# Patient Record
Sex: FEMALE | Race: WHITE | HISPANIC OR LATINO
[De-identification: names, ages, dates, MRNs, and addresses within clinical notes are randomized per-mention and may not be internally consistent; named-entity substitution may affect disease eponyms.]

---

## 2024-10-29 ENCOUNTER — DASHBOARD ENCOUNTERS (OUTPATIENT)
Age: 64
End: 2024-10-29

## 2024-10-29 ENCOUNTER — OFFICE VISIT (OUTPATIENT)
Dept: URBAN - METROPOLITAN AREA CLINIC 60 | Facility: CLINIC | Age: 64
End: 2024-10-29
Payer: COMMERCIAL

## 2024-10-29 VITALS
TEMPERATURE: 97.9 F | DIASTOLIC BLOOD PRESSURE: 80 MMHG | SYSTOLIC BLOOD PRESSURE: 124 MMHG | HEIGHT: 61 IN | WEIGHT: 176.2 LBS | BODY MASS INDEX: 33.27 KG/M2 | HEART RATE: 87 BPM | OXYGEN SATURATION: 95 %

## 2024-10-29 DIAGNOSIS — R14.0 ABDOMINAL BLOATING: ICD-10-CM

## 2024-10-29 DIAGNOSIS — K21.9 GERD WITHOUT ESOPHAGITIS: ICD-10-CM

## 2024-10-29 DIAGNOSIS — Z12.11 COLON CANCER SCREENING: ICD-10-CM

## 2024-10-29 DIAGNOSIS — Z86.0102 HISTORY OF HYPERPLASTIC POLYP OF COLON: ICD-10-CM

## 2024-10-29 PROBLEM — 266435005: Status: ACTIVE | Noted: 2024-10-29

## 2024-10-29 PROCEDURE — 99204 OFFICE O/P NEW MOD 45 MIN: CPT

## 2024-10-29 RX ORDER — ESCITALOPRAM 10 MG/1
TAKE 1 TABLET BY MOUTH DAILY TABLET, FILM COATED ORAL
Qty: 30 EACH | Refills: 0 | Status: ACTIVE | COMMUNITY

## 2024-10-29 RX ORDER — CARVEDILOL 12.5 MG/1
TAKE 1 TABLET BY MOUTH ONCE DAILY TABLET, FILM COATED ORAL
Qty: 90 EACH | Refills: 2 | Status: ACTIVE | COMMUNITY

## 2024-10-29 RX ORDER — PEMBROLIZUMAB 25 MG/ML
AS DIRECTED INJECTION, SOLUTION INTRAVENOUS
Status: ACTIVE | COMMUNITY
Start: 2024-10-29

## 2024-10-29 RX ORDER — LORATADINE 5 MG/1
AS DIRECTED TABLET, CHEWABLE ORAL
Status: ACTIVE | COMMUNITY
Start: 2024-10-29

## 2024-10-29 RX ORDER — ACETAMINOPHEN 325 MG/1
1 TABLET AS NEEDED TABLET, FILM COATED ORAL
Status: ACTIVE | COMMUNITY
Start: 2024-10-29

## 2024-10-29 RX ORDER — FAMOTIDINE 40 MG/1
1 TABLET TABLET, FILM COATED ORAL TWICE A DAY
Qty: 180 TABLET | Refills: 1 | OUTPATIENT
Start: 2024-10-29

## 2024-10-29 NOTE — PHYSICAL EXAM HENT:
Head, normocephalic, atraumatic, Face, Face within normal limits, Ears, External ears within normal limits
Abdomen soft, non-tender and non-distended, no rebound, no guarding and no masses. no hepatosplenomegaly.

## 2024-10-29 NOTE — HPI-TODAY'S VISIT:
Patient is a 63-year-old female coming in for colon cancer screening as well as complaint of abdominal bloating and pain.  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 Currently on Keytruda. Dx in 2021 and completed chemo in 2022.   Patient is Malian-speaking and accompanied by her daughter who assists in translation.  Patient indicates that she is currently undergoing close follow-up with [facility] due to diagnosis with malignant neoplasm of maxillary sinus in 2021.  She undergoes CT scans every 3 to 6 months as well as regular blood work and a PET scan every year.  She is currently on Keytruda for the past year and 1/2 to 2 years and understands that that can give her GI side effects.  She has noticed over the past 3 months increasing abdominal bloating as well as intermittent soft stools with formed stools in between.  No signs of GI bleeding.  Abdominal cramping at times.  Her last PET scan in August did indicate increased FDG activity possibly related to gastritis.  Patient is not currently on any acid suppression medication but does indicate that she has been having reflux over the past few years thought to be due to the chemo medication.  Moving her bowels 2-3 times per day.  Her last colonoscopy in 2017 with a history of colon polyps but she does not recall where it was done.  Her daughter does recall she was told to come back in 5 years.  She had a previous EGD at the same time in 2017. 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 No records of this available.  Labs 9/24/2024 CBC with hemoglobin 12.4, hematocrit 38.2, .8 and platelet 212 CMP with total bilirubin 0.4, AST 21, ALT 17, alk phos 170 and GFR 44  PET scan 8/3/2024 noted postsurgical changes related to endoscopic sinus surgery with partial resection of left maxillary sinus and inferior left orbit with low level FDG activity in surgical cavity more focal and intense in the region of the left pterygoid palatine fossa which could indicate residual or recurrent disease, enlarged FDG avid left axillary lymph node that is increased in size compared to previous CT chest, stable subcentimeter pulmonary nodule in right middle lobe, increased FDG activity along proximal second metatarsal of left foot, diffuse uptake noted in the stomach possibly related to gastritis  CT abdomen pelvis 4/2/2024 with no CT evidence for metastatic disease in the abdomen or pelvis

## 2024-11-05 ENCOUNTER — LAB OUTSIDE AN ENCOUNTER (OUTPATIENT)
Dept: URBAN - METROPOLITAN AREA CLINIC 60 | Facility: CLINIC | Age: 64
End: 2024-11-05

## 2024-11-11 ENCOUNTER — WEB ENCOUNTER (OUTPATIENT)
Dept: URBAN - METROPOLITAN AREA CLINIC 60 | Facility: CLINIC | Age: 64
End: 2024-11-11

## 2024-11-11 RX ORDER — FAMOTIDINE 40 MG/1
1 TABLET TABLET, FILM COATED ORAL TWICE A DAY
Qty: 180 | Refills: 0
Start: 2024-10-29

## 2025-02-11 ENCOUNTER — OFFICE VISIT (OUTPATIENT)
Dept: URBAN - METROPOLITAN AREA SURGERY CENTER 4 | Facility: SURGERY CENTER | Age: 65
End: 2025-02-11
Payer: COMMERCIAL

## 2025-02-11 ENCOUNTER — CLAIMS CREATED FROM THE CLAIM WINDOW (OUTPATIENT)
Dept: URBAN - METROPOLITAN AREA CLINIC 4 | Facility: CLINIC | Age: 65
End: 2025-02-11
Payer: COMMERCIAL

## 2025-02-11 ENCOUNTER — CLAIMS CREATED FROM THE CLAIM WINDOW (OUTPATIENT)
Dept: URBAN - METROPOLITAN AREA SURGERY CENTER 4 | Facility: SURGERY CENTER | Age: 65
End: 2025-02-11

## 2025-02-11 DIAGNOSIS — K29.40 CHRONIC ATROPHIC GASTRITIS WITHOUT BLEEDING: ICD-10-CM

## 2025-02-11 DIAGNOSIS — C7A.092 MALIGNANT CARCINOID TUMOR OF THE STOMACH: ICD-10-CM

## 2025-02-11 DIAGNOSIS — K44.9 HIATAL HERNIA: ICD-10-CM

## 2025-02-11 DIAGNOSIS — Z53.8 PROCEDURE AND TREATMENT NOT CARRIED OUT FOR OTHER REASONS: ICD-10-CM

## 2025-02-11 DIAGNOSIS — Z90.3 HISTORY OF SLEEVE GASTRECTOMY: ICD-10-CM

## 2025-02-11 DIAGNOSIS — K63.5 BENIGN COLON POLYP: ICD-10-CM

## 2025-02-11 DIAGNOSIS — K63.5 COLON POLYP: ICD-10-CM

## 2025-02-11 DIAGNOSIS — K31.7 GASTRIC POLYPS: ICD-10-CM

## 2025-02-11 DIAGNOSIS — D12.7 BENIGN NEOPLASM OF RECTOSIGMOID JUNCTION: ICD-10-CM

## 2025-02-11 DIAGNOSIS — Z86.0100 PERSONAL HISTORY OF COLON POLYPS, UNSPECIFIED: ICD-10-CM

## 2025-02-11 DIAGNOSIS — K31.89 OTHER DISEASES OF STOMACH AND DUODENUM: ICD-10-CM

## 2025-02-11 DIAGNOSIS — K64.1 GRADE II HEMORRHOIDS: ICD-10-CM

## 2025-02-11 DIAGNOSIS — K29.60 OTHER GASTRITIS WITHOUT BLEEDING: ICD-10-CM

## 2025-02-11 DIAGNOSIS — K31.7 GASTRIC POLYP: ICD-10-CM

## 2025-02-11 DIAGNOSIS — Z86.0100 PERSONAL HISTORY OF COLONIC POLYPS: ICD-10-CM

## 2025-02-11 DIAGNOSIS — K29.70 ERYTHEMATOUS GASTROPATHY: ICD-10-CM

## 2025-02-11 DIAGNOSIS — D12.5 BENIGN NEOPLASM OF SIGMOID COLON: ICD-10-CM

## 2025-02-11 DIAGNOSIS — K29.70 GASTRITIS, UNSPECIFIED, WITHOUT BLEEDING: ICD-10-CM

## 2025-02-11 PROCEDURE — 88342 IMHCHEM/IMCYTCHM 1ST ANTB: CPT | Performed by: PATHOLOGY

## 2025-02-11 PROCEDURE — 88305 TISSUE EXAM BY PATHOLOGIST: CPT | Performed by: PATHOLOGY

## 2025-02-11 PROCEDURE — 43239 EGD BIOPSY SINGLE/MULTIPLE: CPT | Performed by: INTERNAL MEDICINE

## 2025-02-11 PROCEDURE — 45385 COLONOSCOPY W/LESION REMOVAL: CPT | Performed by: INTERNAL MEDICINE

## 2025-02-11 PROCEDURE — 88312 SPECIAL STAINS GROUP 1: CPT | Performed by: PATHOLOGY

## 2025-02-11 PROCEDURE — 88341 IMHCHEM/IMCYTCHM EA ADD ANTB: CPT | Performed by: PATHOLOGY

## 2025-02-11 PROCEDURE — 00813 ANES UPR LWR GI NDSC PX: CPT | Performed by: NURSE ANESTHETIST, CERTIFIED REGISTERED

## 2025-02-11 PROCEDURE — G9999 DOC SYS RSN <3 COLON: HCPCS | Performed by: INTERNAL MEDICINE

## 2025-02-11 RX ORDER — FAMOTIDINE 40 MG/1
1 TABLET TABLET, FILM COATED ORAL TWICE A DAY
Qty: 180 | Refills: 0 | Status: ACTIVE | COMMUNITY
Start: 2024-10-29

## 2025-02-11 RX ORDER — ACETAMINOPHEN 325 MG/1
1 TABLET AS NEEDED TABLET, FILM COATED ORAL
Status: ACTIVE | COMMUNITY
Start: 2024-10-29

## 2025-02-11 RX ORDER — PEMBROLIZUMAB 25 MG/ML
AS DIRECTED INJECTION, SOLUTION INTRAVENOUS
Status: ACTIVE | COMMUNITY
Start: 2024-10-29

## 2025-02-11 RX ORDER — ESCITALOPRAM 10 MG/1
TAKE 1 TABLET BY MOUTH DAILY TABLET, FILM COATED ORAL
Qty: 30 EACH | Refills: 0 | Status: ACTIVE | COMMUNITY

## 2025-02-11 RX ORDER — LORATADINE 5 MG/1
AS DIRECTED TABLET, CHEWABLE ORAL
Status: ACTIVE | COMMUNITY
Start: 2024-10-29

## 2025-02-11 RX ORDER — CARVEDILOL 12.5 MG/1
TAKE 1 TABLET BY MOUTH ONCE DAILY TABLET, FILM COATED ORAL
Qty: 90 EACH | Refills: 2 | Status: ACTIVE | COMMUNITY

## 2025-02-25 ENCOUNTER — LAB OUTSIDE AN ENCOUNTER (OUTPATIENT)
Dept: URBAN - METROPOLITAN AREA CLINIC 60 | Facility: CLINIC | Age: 65
End: 2025-02-25

## 2025-02-25 ENCOUNTER — OFFICE VISIT (OUTPATIENT)
Dept: URBAN - METROPOLITAN AREA CLINIC 60 | Facility: CLINIC | Age: 65
End: 2025-02-25
Payer: COMMERCIAL

## 2025-02-25 VITALS
HEART RATE: 116 BPM | HEIGHT: 61 IN | OXYGEN SATURATION: 95 % | BODY MASS INDEX: 32.06 KG/M2 | WEIGHT: 169.8 LBS | TEMPERATURE: 98.5 F | DIASTOLIC BLOOD PRESSURE: 74 MMHG | SYSTOLIC BLOOD PRESSURE: 116 MMHG | RESPIRATION RATE: 12 BRPM

## 2025-02-25 DIAGNOSIS — D3A.092: ICD-10-CM

## 2025-02-25 DIAGNOSIS — R14.0 ABDOMINAL BLOATING: ICD-10-CM

## 2025-02-25 DIAGNOSIS — K29.40 ATROPHIC GASTRITIS WITHOUT HEMORRHAGE: ICD-10-CM

## 2025-02-25 DIAGNOSIS — K21.9 GERD WITHOUT ESOPHAGITIS: ICD-10-CM

## 2025-02-25 PROBLEM — 84568007: Status: ACTIVE | Noted: 2025-02-25

## 2025-02-25 PROCEDURE — 99214 OFFICE O/P EST MOD 30 MIN: CPT

## 2025-02-25 RX ORDER — CARVEDILOL 12.5 MG/1
TAKE 1 TABLET BY MOUTH ONCE DAILY TABLET, FILM COATED ORAL
Qty: 90 EACH | Refills: 2 | Status: ACTIVE | COMMUNITY

## 2025-02-25 RX ORDER — PANTOPRAZOLE SODIUM 40 MG/1
1 TABLET 1/2 TO 1 HOUR BEFORE MORNING MEAL TABLET, DELAYED RELEASE ORAL ONCE A DAY
Qty: 30 | Refills: 3 | OUTPATIENT
Start: 2025-02-25

## 2025-02-25 RX ORDER — FAMOTIDINE 40 MG/1
1 TABLET TABLET, FILM COATED ORAL TWICE A DAY
Qty: 180 | Refills: 0 | Status: ACTIVE | COMMUNITY
Start: 2024-10-29

## 2025-02-25 RX ORDER — ACETAMINOPHEN 325 MG/1
1 TABLET AS NEEDED TABLET, FILM COATED ORAL
Status: ACTIVE | COMMUNITY
Start: 2024-10-29

## 2025-02-25 RX ORDER — LORATADINE 5 MG/1
AS DIRECTED TABLET, CHEWABLE ORAL
Status: ACTIVE | COMMUNITY
Start: 2024-10-29

## 2025-02-25 RX ORDER — PEMBROLIZUMAB 25 MG/ML
AS DIRECTED INJECTION, SOLUTION INTRAVENOUS
Status: ACTIVE | COMMUNITY
Start: 2024-10-29

## 2025-02-25 RX ORDER — ESCITALOPRAM 10 MG/1
TAKE 1 TABLET BY MOUTH DAILY TABLET, FILM COATED ORAL
Qty: 30 EACH | Refills: 0 | Status: ACTIVE | COMMUNITY

## 2025-02-25 NOTE — HPI-TODAY'S VISIT:
Patient comes in for EGD and colonoscopy follow-up visit.  Patient is Slovak-speaking and accompanied by her daughter who translates for us.  She has continued to follow-up with [facility] for malignant neoplasm of maxillary sinus and currently on Keytruda.  Colonoscopy finding a few adenomatous polyps that were removed and patient given 1 year recall due to suboptimal prep.  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  I discussed these results in depth with the patient and her daughter and answered all questions to the best of my ability.  I also previously discussed this case with Dr. Moreno and he recommended labs to see which were ordered below), repeat EGD in 6 months as well as PET Ga-68 Dotatate scan to specifically look at NETs.  I additionally printed out records for patient to promptly scan over to her oncologist in [place] and further old records.  We additionally fax them over to the office.  They will be calling the office today to get further guidance given this new diagnosis.  Patient states she has changed her diet has been eating a very clean diet avoid of processed foods.  She has been avoiding dairy, gluten and sugar in her abdominal bloating symptoms have greatly improved.  Still reporting intermittent abdominal bloating but much better than previous.  Bowel movements regular.  No sign of GI bleeding, fever, chills, nausea or vomiting.  Last OV 10/2024: Patient is a 63-year-old female coming in for colon cancer screening as well as complaint of abdominal bloating and pain.  Past medical history significant for leiomyosarcoma and malignant neoplasm of maxillary sinus following with [facility]. Currently on Keytruda. Dx in 2021 and completed chemo in 2022.   Patient is Slovak-speaking and accompanied by her daughter who assists in translation.  Patient indicates that she is currently undergoing close follow-up with [facility] due to diagnosis with malignant neoplasm of maxillary sinus in 2021.  She undergoes CT scans every 3 to 6 months as well as regular blood work and a PET scan every year.  She is currently on Keytruda for the past year and 1/2 to 2 years and understands that that can give her GI side effects.  She has noticed over the past 3 months increasing abdominal bloating as well as intermittent soft stools with formed stools in between.  No signs of GI bleeding.  Abdominal cramping at times.  Her last PET scan in August did indicate increased FDG activity possibly related to gastritis.  Patient is not currently on any acid suppression medication but does indicate that she has been having reflux over the past few years thought to be due to the chemo medication.  Moving her bowels 2-3 times per day.  Her last colonoscopy in 2017 with a history of colon polyps but she does not recall where it was done.  Her daughter does recall she was told to come back in 5 years.  She had a previous EGD at the same time in 2017. 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 No records of this available.  Labs 9/24/2024 CBC with hemoglobin 12.4, hematocrit 38.2, .8 and platelet 212 CMP with total bilirubin 0.4, AST 21, ALT 17, alk phos 170 and GFR 44  PET scan 8/3/2024 noted postsurgical changes related to endoscopic sinus surgery with partial resection of left maxillary sinus and inferior left orbit with low level FDG activity in surgical cavity more focal and intense in the region of the left pterygoid palatine fossa which could indicate residual or recurrent disease, enlarged FDG avid left axillary lymph node that is increased in size compared to previous CT chest, stable subcentimeter pulmonary nodule in right middle lobe, increased FDG activity along proximal second metatarsal of left foot, diffuse uptake noted in the stomach possibly related to gastritis  CT abdomen pelvis 4/2/2024 with no CT evidence for metastatic disease in the abdomen or pelvis

## 2025-02-25 NOTE — HPI-PREVIOUS LABS
Labs 9/24/2024 CBC with hemoglobin 12.4, hematocrit 38.2, .8 and platelet 212 CMP with total bilirubin 0.4, AST 21, ALT 17, alk phos 170 and GFR 44

## 2025-02-25 NOTE — HPI-PREVIOUS PROCEDURES
EGD 2/11/2025 by Dr. Moreno with normal esophagus, 2 cm hiatal hernia, congested and erythematous mucosa in cardia and gastric body, single gastric polyp, sleeve gastrectomy was found, normal examined duodenum.  Repeat 1 to 2 years due to history of gastric carcinoid tumor.  Antral stomach biopsy with reactive gastropathy negative for H. pylori.  Stomach body biopsy with chronic lymphoplasmacytic infiltrate and focal intestinal metaplasia.  Proximal stomach biopsy with a fragment of well-differentiated neuroendocrine tumor low-grade (G1)/carcinoid tumor most likely arising in the setting of autoimmune atrophic gastritis with focal intestinal metaplasia  Colonoscopy 2/11/2025 by Dr. Moreno noted fair colon prep, 10 mm polyp in the sigmoid colon, 4 to 6 mm polyp in the rectosigmoid colon, 4 mm polyp in rectosigmoid colon, external and internal hemorrhoids, decreased anal sphincter tone.  Repeat 1 year due to suboptimal prep.  Polyps tubular adenomas.

## 2025-02-25 NOTE — HPI-PREVIOUS IMAGING
PET scan 8/3/2024 noted postsurgical changes related to endoscopic sinus surgery with partial resection of left maxillary sinus and inferior left orbit with low level FDG activity in surgical cavity more focal and intense in the region of the left pterygoid palatine fossa which could indicate residual or recurrent disease, enlarged FDG avid left axillary lymph node that is increased in size compared to previous CT chest, stable subcentimeter pulmonary nodule in right middle lobe, increased FDG activity along proximal second metatarsal of left foot, diffuse uptake noted in the stomach possibly related to gastritis  CT abdomen pelvis 4/2/2024 with no CT evidence for metastatic disease in the abdomen or pelvis.

## 2025-02-27 ENCOUNTER — TELEPHONE ENCOUNTER (OUTPATIENT)
Dept: URBAN - METROPOLITAN AREA CLINIC 63 | Facility: CLINIC | Age: 65
End: 2025-02-27

## 2025-03-12 ENCOUNTER — TELEPHONE ENCOUNTER (OUTPATIENT)
Dept: URBAN - METROPOLITAN AREA CLINIC 63 | Facility: CLINIC | Age: 65
End: 2025-03-12

## 2025-04-07 ENCOUNTER — OFFICE VISIT (OUTPATIENT)
Dept: URBAN - METROPOLITAN AREA CLINIC 60 | Facility: CLINIC | Age: 65
End: 2025-04-07

## 2025-04-07 RX ORDER — FAMOTIDINE 40 MG/1
1 TABLET TABLET, FILM COATED ORAL TWICE A DAY
Qty: 180 | Refills: 0 | COMMUNITY
Start: 2024-10-29

## 2025-04-07 RX ORDER — ESCITALOPRAM 10 MG/1
TAKE 1 TABLET BY MOUTH DAILY TABLET, FILM COATED ORAL
Qty: 30 EACH | Refills: 0 | COMMUNITY

## 2025-04-07 RX ORDER — LORATADINE 5 MG/1
AS DIRECTED TABLET, CHEWABLE ORAL
COMMUNITY
Start: 2024-10-29

## 2025-04-07 RX ORDER — CARVEDILOL 12.5 MG/1
TAKE 1 TABLET BY MOUTH ONCE DAILY TABLET, FILM COATED ORAL
Qty: 90 EACH | Refills: 2 | COMMUNITY

## 2025-04-07 RX ORDER — PEMBROLIZUMAB 25 MG/ML
AS DIRECTED INJECTION, SOLUTION INTRAVENOUS
COMMUNITY
Start: 2024-10-29

## 2025-04-07 RX ORDER — PANTOPRAZOLE SODIUM 40 MG/1
1 TABLET 1/2 TO 1 HOUR BEFORE MORNING MEAL TABLET, DELAYED RELEASE ORAL ONCE A DAY
Qty: 30 | Refills: 3 | COMMUNITY
Start: 2025-02-25

## 2025-04-07 RX ORDER — ACETAMINOPHEN 325 MG/1
1 TABLET AS NEEDED TABLET, FILM COATED ORAL
COMMUNITY
Start: 2024-10-29

## 2025-05-21 ENCOUNTER — P2P PATIENT RECORD (OUTPATIENT)
Age: 65
End: 2025-05-21